# Patient Record
Sex: MALE | Race: WHITE | URBAN - METROPOLITAN AREA
[De-identification: names, ages, dates, MRNs, and addresses within clinical notes are randomized per-mention and may not be internally consistent; named-entity substitution may affect disease eponyms.]

---

## 2018-02-19 ENCOUNTER — THERAPY VISIT (OUTPATIENT)
Dept: PHYSICAL THERAPY | Facility: CLINIC | Age: 53
End: 2018-02-19
Payer: COMMERCIAL

## 2018-02-19 DIAGNOSIS — M25.512 CHRONIC LEFT SHOULDER PAIN: Primary | ICD-10-CM

## 2018-02-19 DIAGNOSIS — M25.312 SHOULDER INSTABILITY, LEFT: ICD-10-CM

## 2018-02-19 DIAGNOSIS — G89.29 CHRONIC LEFT SHOULDER PAIN: Primary | ICD-10-CM

## 2018-02-19 PROCEDURE — 97110 THERAPEUTIC EXERCISES: CPT | Mod: GP | Performed by: PHYSICAL THERAPIST

## 2018-02-19 PROCEDURE — 97161 PT EVAL LOW COMPLEX 20 MIN: CPT | Mod: GP | Performed by: PHYSICAL THERAPIST

## 2018-02-19 NOTE — PROGRESS NOTES
[facility] Initial Evaluation  Subjective:  Patient is a 53 year old male presenting with rehab left shoulder hpi.   Sina Ghotra is a 53 year old male with a left shoulder condition.  Occurance: tricep extension and dislocated his shoulder 5 years ago. Self treated with some exercises, Patient since then has dislocated up to 6 x now. Rotational and throwing things is what usually sets it off.   Condition occurred: in the community.  This is a recurrent condition  2/12/18 Date of MD order.    Patient reports pain:  In the joint (minimal pain).  Radiates to:  Upper arm.  Pain is described as aching and is intermittent and reported as 4/10.  Associated symptoms:  Loss of strength and loss of motion/stiffness. Pain is the same all the time.  Exacerbated by: throwing motion, rotation, raising arm or doing any circular motion. and relieved by nothing.  Since onset symptoms are unchanged.        General health as reported by patient is good.  Pertinent medical history includes:  None.  Medical allergies: no.  Other surgeries include:  None reported.  Current medications:  Pain medication.  Current occupation is  [employer].  Patient is working in normal job without restrictions.  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    Barriers include:  None as reported by the patient.    Red flags:  None as reported by the patient.                        Objective:  System                   Shoulder Evaluation:  ROM:  AROM:    Flexion:  Left:  166    Right:  166  Extension: Left: 50Right: 50  Abduction:  Left: 180   Right:  180      External Rotation:  Left:  85    Right:  88            Extension/Internal Rotation:  Left:  T10    Right:  T10    PROM:  not assessed                                Strength:    Flexion: Left:5/5 Strong/pain free    Pain:    Right: 5/5  Strong/pain free     Pain:   Extension:  Left: 4/5  Weak/pain free    Pain:     Right: 5/5    Strong/pain free  Pain:  Abduction:  Left: 4+/5  Weak/pain free  Pain:    Right: 5/5   Strong/pain free    Pain:  Adduction:  Left: 5/5   Strong/pain free   Pain:    Right: 5/5   Strong/pain free    Pain:  Internal Rotation:  Left:4/5   Weak/pain free    Pain:    Right: 5/5   Strong/pain free    Pain:  External Rotation:   Left:4/5   Weak/pain free    Pain:   Right:5/5   Strong/pain free    Pain:    Horizontal Abduction:  Left:4/5   Weak/pain free    Pain:    Right:5/5   Strong/pain free   Pain:  Horizontal Adduction:  Left:5/5   Strong/pain free    Pain:    Right:5/5   Strong/pain free    Pain:  Elbow Flexion:  Left:4/5   Weak/pain free    Pain:    Right:5/5   Strong/pain free    Pain:  Elbow Extension:  Left:4/5   Weak/pain free    Pain:    Right:5/5   Strong/pain free    Pain:  Stability Testing:    Left shoulder stability positive testing:  Internal Rotation  Left shoulder stability negative testing:  External Rotation    Right shoulder stability negative testing:  Internal Rotation and External Rotation  Special Tests:      Left shoulder negative for the following special tests:  Labral; Impingement; Rotator cuff tear and Acromioclavicular    Palpation:    Left shoulder tenderness present at:  Supraspinatus and Bicipital Groove  Left shoulder tenderness not present at: Infraspinatus; Subscapularis or Deltoid    Mobility Tests:  normal                                                 General     ROS    Assessment/Plan:    Patient is a 53 year old male with left side shoulder complaints.    Patient has the following significant findings with corresponding treatment plan.                Diagnosis 1:  Left shoulder pain / instability  Pain -  hot/cold therapy, manual therapy, self management, education, directional preference exercise and home program  Decreased strength - therapeutic exercise, therapeutic activities and home program  Decreased function - therapeutic activities and home  program  Impaired posture - neuro re-education, therapeutic activities and home program  Instability -  Therapeutic Activity  Therapeutic Exercise  Neuromuscular Re-education  home program    Therapy Evaluation Codes:   1) History comprised of:   Personal factors that impact the plan of care:      Time since onset of symptoms.    Comorbidity factors that impact the plan of care are:      None.     Medications impacting care: None.  2) Examination of Body Systems comprised of:   Body structures and functions that impact the plan of care:      Shoulder.   Activity limitations that impact the plan of care are:      Lifting and Sports.  3) Clinical presentation characteristics are:   Stable/Uncomplicated.  4) Decision-Making    Low complexity using standardized patient assessment instrument and/or measureable assessment of functional outcome.  Cumulative Therapy Evaluation is: Low complexity.    Previous and current functional limitations:  (See Goal Flow Sheet for this information)    Short term and Long term goals: (See Goal Flow Sheet for this information)     Communication ability:  Patient appears to be able to clearly communicate and understand verbal and written communication and follow directions correctly.  Treatment Explanation - The following has been discussed with the patient:   RX ordered/plan of care  Anticipated outcomes  Possible risks and side effects  This patient would benefit from PT intervention to resume normal activities.   Rehab potential is good.    Frequency:  1 X week, once daily  Duration:  for 6 weeks  Discharge Plan:  Achieve all LTG.  Independent in home treatment program.  Reach maximal therapeutic benefit.    Please refer to the daily flowsheet for treatment today, total treatment time and time spent performing 1:1 timed codes.

## 2018-02-19 NOTE — LETTER
[facility] PHYSICAL THERAPY  [address]  [facility] [address]  [phone]    2018    Re: Sina Ghotra   :   1965  MRN:  [id number]   REFERRING PHYSICIAN:   Roberto Fitzgerald    [facility] PHYSICAL THERAPY  Date of Initial Evaluation: 18  Visits:  Rxs Used: 1  Reason for Referral:     Chronic left shoulder pain  Shoulder instability, left    EVALUATION SUMMARY    [facility] Initial Evaluation    Subjective:  Patient is a 53 year old male presenting with rehab left shoulder hpi.   Sina Ghotra is a 53 year old male with a left shoulder condition.  Occurance: tricep extension and dislocated his shoulder 5 years ago. Self treated with some exercises, Patient since then has dislocated up to 6 x now. Rotational and throwing things is what usually sets it off.   Condition occurred: in the community.  This is a recurrent condition  18 Date of MD order.      Patient reports pain:  In the joint (minimal pain).  Radiates to:  Upper arm.  Pain is described as aching and is intermittent and reported as 4/10.  Associated symptoms:  Loss of strength and loss of motion/stiffness. Pain is the same all the time.  Exacerbated by: throwing motion, rotation, raising arm or doing any circular motion. and relieved by nothing.  Since onset symptoms are unchanged. General health as reported by patient is good.      Pertinent medical history includes:  None.  Medical allergies: no.  Other surgeries include:  None reported.  Current medications:  Pain medication.  Current occupation is  [employer].  Patient is working in normal job without restrictions.  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    Barriers include:  None as reported by the patient.  Red flags:  None as reported by the patient.             Objective:    Shoulder Evaluation:  ROM:  AROM:    Flexion:   Left:  166    Right:  166  Extension: Left: 50Right: 50  Abduction:  Left: 180   Right:  180    External Rotation:  Left:  85    Right:  88  Extension/Internal Rotation:  Left:  T10    Right:  T10      PROM:  not assessed    Strength:    Flexion: Left:5/5 Strong/pain free    Pain:    Right: 5/5  Strong/pain free     Pain:   Extension:  Left: 4/5  Weak/pain free    Pain:    Right: 5/5    Strong/pain free  Pain:  Abduction:  Left: 4+/5  Weak/pain free  Pain:    Right: 5/5   Strong/pain free    Pain:  Adduction:  Left: 5/5   Strong/pain free   Pain:    Right: 5/5   Strong/pain free    Pain:  Internal Rotation:  Left:4/5   Weak/pain free    Pain:    Right: 5/5   Strong/pain free    Pain:  External Rotation:   Left:4/5   Weak/pain free    Pain:   Right:5/5   Strong/pain free    Pain:    Horizontal Abduction:  Left:4/5   Weak/pain free    Pain:    Right:5/5   Strong/pain free   Pain:  Horizontal Adduction:  Left:5/5   Strong/pain free    Pain:    Right:5/5   Strong/pain free    Pain:  Elbow Flexion:  Left:4/5   Weak/pain free    Pain:    Right:5/5   Strong/pain free    Pain:  Elbow Extension:  Left:4/5   Weak/pain free    Pain:    Right:5/5   Strong/pain free    Pain:    Stability Testing:    Left shoulder stability positive testing:  Internal Rotation  Left shoulder stability negative testing:  External Rotation    Right shoulder stability negative testing:  Internal Rotation and External Rotation    Special Tests:      Left shoulder negative for the following special tests:  Labral; Impingement; Rotator cuff tear and Acromioclavicular    Palpation:    Left shoulder tenderness present at:  Supraspinatus and Bicipital Groove  Left shoulder tenderness not present at: Infraspinatus; Subscapularis or Deltoid    Mobility Tests:  normal    Assessment/Plan:    Patient is a 53 year old male with left side shoulder complaints.    Patient has the following significant findings with corresponding treatment plan.                   Diagnosis 1:  Left shoulder pain / instability  Pain -  hot/cold therapy, manual therapy, self management, education, directional preference exercise and home program  Decreased strength - therapeutic exercise, therapeutic activities and home program  Decreased function - therapeutic activities and home program  Impaired posture - neuro re-education, therapeutic activities and home program  Instability -  Therapeutic Activity  Therapeutic Exercise  Neuromuscular Re-education  home program          Therapy Evaluation Codes:   1) History comprised of:   Personal factors that impact the plan of care:      Time since onset of symptoms.    Comorbidity factors that impact the plan of care are:      None.     Medications impacting care: None.  2) Examination of Body Systems comprised of:   Body structures and functions that impact the plan of care:      Shoulder.   Activity limitations that impact the plan of care are:      Lifting and Sports.  3) Clinical presentation characteristics are:   Stable/Uncomplicated.  4) Decision-Making    Low complexity using standardized patient assessment instrument and/or measureable assessment of functional outcome.  Cumulative Therapy Evaluation is: Low complexity.    Previous and current functional limitations:  (See Goal Flow Sheet for this information)    Short term and Long term goals: (See Goal Flow Sheet for this information)     Communication ability:  Patient appears to be able to clearly communicate and understand verbal and written communication and follow directions correctly.    Treatment Explanation - The following has been discussed with the patient:   RX ordered/plan of care  Anticipated outcomes  Possible risks and side effects    This patient would benefit from PT intervention to resume normal activities.   Rehab potential is good.  Frequency:  1 X week, once daily  Duration:  for 6 weeks  Discharge Plan:  Achieve all LTG.  Independent in home treatment  program.  Reach maximal therapeutic benefit.    Thank you for your referral.    INQUIRIES  Therapist: Efrain Cardoso DPT  [facility] PHYSICAL THERAPY  [address]  [facility] [address]  Phone: [phone]  Fax: [phone]